# Patient Record
Sex: MALE | Race: BLACK OR AFRICAN AMERICAN | NOT HISPANIC OR LATINO
[De-identification: names, ages, dates, MRNs, and addresses within clinical notes are randomized per-mention and may not be internally consistent; named-entity substitution may affect disease eponyms.]

---

## 2018-03-22 PROBLEM — Z00.00 ENCOUNTER FOR PREVENTIVE HEALTH EXAMINATION: Status: ACTIVE | Noted: 2018-03-22

## 2018-03-30 ENCOUNTER — APPOINTMENT (OUTPATIENT)
Dept: UROLOGY | Facility: CLINIC | Age: 58
End: 2018-03-30

## 2018-05-11 ENCOUNTER — APPOINTMENT (OUTPATIENT)
Dept: UROLOGY | Facility: CLINIC | Age: 58
End: 2018-05-11

## 2020-03-29 ENCOUNTER — EMERGENCY (EMERGENCY)
Facility: HOSPITAL | Age: 60
LOS: 1 days | Discharge: HOME | End: 2020-03-29
Admitting: INTERNAL MEDICINE
Payer: COMMERCIAL

## 2020-03-29 VITALS
TEMPERATURE: 102 F | RESPIRATION RATE: 18 BRPM | DIASTOLIC BLOOD PRESSURE: 78 MMHG | SYSTOLIC BLOOD PRESSURE: 129 MMHG | HEART RATE: 98 BPM | OXYGEN SATURATION: 95 %

## 2020-03-29 VITALS
HEART RATE: 102 BPM | TEMPERATURE: 101 F | OXYGEN SATURATION: 99 % | SYSTOLIC BLOOD PRESSURE: 162 MMHG | DIASTOLIC BLOOD PRESSURE: 68 MMHG | RESPIRATION RATE: 17 BRPM

## 2020-03-29 PROCEDURE — 71045 X-RAY EXAM CHEST 1 VIEW: CPT | Mod: 26

## 2020-03-29 PROCEDURE — 99283 EMERGENCY DEPT VISIT LOW MDM: CPT

## 2020-03-29 RX ORDER — ALBUTEROL 90 UG/1
2 AEROSOL, METERED ORAL
Qty: 1 | Refills: 0
Start: 2020-03-29 | End: 2020-04-02

## 2020-03-29 RX ORDER — IBUPROFEN 200 MG
600 TABLET ORAL ONCE
Refills: 0 | Status: COMPLETED | OUTPATIENT
Start: 2020-03-29 | End: 2020-03-29

## 2020-03-29 RX ORDER — ONDANSETRON 8 MG/1
4 TABLET, FILM COATED ORAL ONCE
Refills: 0 | Status: COMPLETED | OUTPATIENT
Start: 2020-03-29 | End: 2020-03-29

## 2020-03-29 RX ORDER — ACETAMINOPHEN 500 MG
975 TABLET ORAL ONCE
Refills: 0 | Status: COMPLETED | OUTPATIENT
Start: 2020-03-29 | End: 2020-03-29

## 2020-03-29 RX ORDER — AZITHROMYCIN 500 MG/1
1 TABLET, FILM COATED ORAL
Qty: 6 | Refills: 0
Start: 2020-03-29 | End: 2020-04-02

## 2020-03-29 RX ADMIN — Medication 975 MILLIGRAM(S): at 16:43

## 2020-03-29 RX ADMIN — ONDANSETRON 4 MILLIGRAM(S): 8 TABLET, FILM COATED ORAL at 16:44

## 2020-03-29 RX ADMIN — Medication 600 MILLIGRAM(S): at 16:44

## 2020-03-29 NOTE — ED PROVIDER NOTE - OBJECTIVE STATEMENT
59 y.o male with no pMHx presents to the ED complaining of having cough fever along with shortness of breath over the past few days. Pt states that the fever has been approx 101 for which he has been taking tylenol and motrin. Pt states that he has having a mostly dry cough. Pt states that he hasn't had any sick contacts at home. Pt currently denies having any nausea, vomiting, diarrhea, CP, MARCH, abdominal pain. Pt is a non-smoker.

## 2020-03-29 NOTE — ED PROVIDER NOTE - NSFOLLOWUPINSTRUCTIONS_ED_ALL_ED_FT
You may have Coronavirus    COVID-19 testing are currently being prioritized at [facility] for admitted patients.     Patients that are stable for discharged from the ED will have COVID-19 testing performed within 7-10 days at which time most symptoms should improve.  Please follow instruction on provided COVID-19 discharge educational forms and self quarantine for 14 days.     In addition, you have been placed on our surveillance tracker. Return to the ED immediately if you have *shortness of breath*, fever, pain, weakness, vomiting any concerns.    1. STAY HOME for 14 DAYS,  Minimize Human contact to ONLY ESSENTIAL  2. Every time you wash your hands, sing the HAPPY BIRTHDAY Song so you know you're washing long enough.  Make sure to scrub the webspace between your fingers.  3. DRINK 1-2 Liters of Pedialyte or Sugarfree Gatorade per day x at least 5 days.  The more you drink, the more energy you will have.  Your fatigue, lightheadedness, and body aches will decrease and your fever has a better chance of breaking if you are well hydrated.    4. For your Fever and Body aches takes TYLENOL 650 mg every 6 hours   5. Use an inhaler for mild shortness of breath and cough.  7. Buy a Pulse Oximeter to check your Oxygen  8. Stay away from anyone that is elderly, even if you live with them.  9. Even if you feel better, you must stay isolated for at least 3 days after your symptoms resolve without taking tylenol.    RETURN TO THE ER IMMEDIATELY IF YOU HAVE WORSENING SHORTNESS OF BREATH OR Oxygen < 93%    Consider Using [employer] NOW To get Pedialyte AND CAPSULE FOR Medicine to be delivered to your home.

## 2020-03-29 NOTE — ED PROVIDER NOTE - CLINICAL SUMMARY MEDICAL DECISION MAKING FREE TEXT BOX
59 y.o male with no pMHx presents to the ED complaining of having cough fever along with shortness of breath over the past few days. SOB/viral syndrome-likely covid. medication, chest xray reassess

## 2020-03-29 NOTE — ED PROVIDER NOTE - PATIENT PORTAL LINK FT
You can access the FollowMyHealth Patient Portal offered by [facility] by registering at the following website: [email or web address]. By joining [facility]’s FollowMyHealth portal, you will also be able to view your health information using other applications (apps) compatible with our system.

## 2021-01-06 ENCOUNTER — APPOINTMENT (OUTPATIENT)
Dept: UROLOGY | Facility: CLINIC | Age: 61
End: 2021-01-06
Payer: COMMERCIAL

## 2021-01-06 VITALS
BODY MASS INDEX: 30.31 KG/M2 | DIASTOLIC BLOOD PRESSURE: 90 MMHG | HEIGHT: 68 IN | TEMPERATURE: 98 F | WEIGHT: 200 LBS | SYSTOLIC BLOOD PRESSURE: 144 MMHG | HEART RATE: 77 BPM

## 2021-01-06 DIAGNOSIS — Z80.9 FAMILY HISTORY OF MALIGNANT NEOPLASM, UNSPECIFIED: ICD-10-CM

## 2021-01-06 DIAGNOSIS — N40.0 BENIGN PROSTATIC HYPERPLASIA WITHOUT LOWER URINARY TRACT SYMPMS: ICD-10-CM

## 2021-01-06 DIAGNOSIS — Z78.9 OTHER SPECIFIED HEALTH STATUS: ICD-10-CM

## 2021-01-06 DIAGNOSIS — Z80.0 FAMILY HISTORY OF MALIGNANT NEOPLASM OF DIGESTIVE ORGANS: ICD-10-CM

## 2021-01-06 DIAGNOSIS — Z87.442 PERSONAL HISTORY OF URINARY CALCULI: ICD-10-CM

## 2021-01-06 PROCEDURE — 99072 ADDL SUPL MATRL&STAF TM PHE: CPT

## 2021-01-06 PROCEDURE — 99203 OFFICE O/P NEW LOW 30 MIN: CPT

## 2021-01-06 NOTE — HISTORY OF PRESENT ILLNESS
[FreeTextEntry1] : cc bph\par pt is  yo male referred for bph . The patient reports frequency . He denies , incomplete emptying, intermittency, straining, urgency and weak stream. hematuria and dysuria  The patient states symptoms are of mild  severity. The symptoms have been present for . He reports a history of no complicating symptoms. He denies flank pain, gross hematuria, kidney stones and recurrent UTI.\par  Prior treatments include none\par last psa 0.64\par The is no family history of prostate cancer\par

## 2021-01-06 NOTE — ASSESSMENT
[FreeTextEntry1] : We had a long discussion regarding the urinary symptoms and management options. Patient should try to restrict fluids a few hours before bedtime, elevate feet if there is any edema and avoid irritating drinks or foods such as coffee, tea, alcohol, spicy foods, etc. Medical management with alpha blockers (such as Flomax, Rapaflo), 5 alpha reductase inhibitors (such as Finasteride, Avodart), daily Cialis especially if there is erectile dysfunction, anticholinergic medications (such as Ditropan, Vesicare, Toviaz, Oxytrol patches) and Myrbetriq was reviewed. Surgical options discussed included photo-vaporization of the prostate also known as Greenlight laser, microwave thermotherapy, transurethral resection of the prostate (TURP) or suprapubic prostatectomy depending on the size of prostate. We also discussed intra-vesical injection of Botox for overactive bladder symptoms. Risks and benefits of each treatment were discussed included but were not limited to worsening urinary symptoms, incontinence, hematuria, erectile dysfunction, ED, retrograde ejaculation, etc. Also, workup with cystoscopy, post-void residual determination and urodynamics study and their indications were reviewed.\par \par low pvr today \par mild luts minimal bother \par reduce caffeine \par f/u if symptoms worsen

## 2021-01-06 NOTE — REVIEW OF SYSTEMS
[Wake up at night to urinate  How many times?  ___] : wakes up to urinate [unfilled] times during the night [Negative] : Heme/Lymph [FreeTextEntry2] : Frequent urination

## 2023-10-16 NOTE — ED PROVIDER NOTE - CARE PLAN
Normal vision: sees adequately in most situations; can see medication labels, newsprint Principal Discharge DX:	Coronavirus infection